# Patient Record
Sex: FEMALE | Race: NATIVE HAWAIIAN OR OTHER PACIFIC ISLANDER | NOT HISPANIC OR LATINO | Employment: STUDENT | URBAN - METROPOLITAN AREA
[De-identification: names, ages, dates, MRNs, and addresses within clinical notes are randomized per-mention and may not be internally consistent; named-entity substitution may affect disease eponyms.]

---

## 2022-02-24 ENCOUNTER — HOSPITAL ENCOUNTER (EMERGENCY)
Facility: CLINIC | Age: 27
Discharge: HOME | End: 2022-02-25
Attending: EMERGENCY MEDICINE | Admitting: EMERGENCY MEDICINE
Payer: COMMERCIAL

## 2022-02-24 VITALS
SYSTOLIC BLOOD PRESSURE: 129 MMHG | RESPIRATION RATE: 16 BRPM | DIASTOLIC BLOOD PRESSURE: 91 MMHG | TEMPERATURE: 97.5 F | OXYGEN SATURATION: 100 % | BODY MASS INDEX: 28.12 KG/M2 | HEART RATE: 106 BPM | WEIGHT: 175 LBS | HEIGHT: 66 IN

## 2022-02-24 DIAGNOSIS — M79.662 PAIN OF LEFT LOWER LEG: ICD-10-CM

## 2022-02-24 LAB — D DIMER PPP FEU-MCNC: <0.27 UG/ML FEU (ref 0–0.5)

## 2022-02-24 PROCEDURE — 80053 COMPREHEN METABOLIC PANEL: CPT | Performed by: EMERGENCY MEDICINE

## 2022-02-24 PROCEDURE — 84703 CHORIONIC GONADOTROPIN ASSAY: CPT | Performed by: EMERGENCY MEDICINE

## 2022-02-24 PROCEDURE — 99285 EMERGENCY DEPT VISIT HI MDM: CPT | Performed by: EMERGENCY MEDICINE

## 2022-02-24 PROCEDURE — 85025 COMPLETE CBC W/AUTO DIFF WBC: CPT | Performed by: EMERGENCY MEDICINE

## 2022-02-24 PROCEDURE — 99285 EMERGENCY DEPT VISIT HI MDM: CPT | Mod: 25 | Performed by: EMERGENCY MEDICINE

## 2022-02-24 PROCEDURE — 85014 HEMATOCRIT: CPT | Performed by: EMERGENCY MEDICINE

## 2022-02-24 PROCEDURE — 84484 ASSAY OF TROPONIN QUANT: CPT | Performed by: EMERGENCY MEDICINE

## 2022-02-24 PROCEDURE — 85379 FIBRIN DEGRADATION QUANT: CPT | Performed by: EMERGENCY MEDICINE

## 2022-02-24 PROCEDURE — 36415 COLL VENOUS BLD VENIPUNCTURE: CPT | Performed by: EMERGENCY MEDICINE

## 2022-02-24 PROCEDURE — 99284 EMERGENCY DEPT VISIT MOD MDM: CPT | Mod: 25 | Performed by: EMERGENCY MEDICINE

## 2022-02-25 ENCOUNTER — APPOINTMENT (OUTPATIENT)
Dept: CT IMAGING | Facility: CLINIC | Age: 27
End: 2022-02-25
Attending: EMERGENCY MEDICINE
Payer: COMMERCIAL

## 2022-02-25 ENCOUNTER — APPOINTMENT (OUTPATIENT)
Dept: ULTRASOUND IMAGING | Facility: CLINIC | Age: 27
End: 2022-02-25
Attending: EMERGENCY MEDICINE
Payer: COMMERCIAL

## 2022-02-25 LAB
ALBUMIN SERPL-MCNC: 4.2 G/DL (ref 3.4–5)
ALP SERPL-CCNC: 72 U/L (ref 40–150)
ALT SERPL W P-5'-P-CCNC: 17 U/L (ref 0–50)
ANION GAP SERPL CALCULATED.3IONS-SCNC: 7 MMOL/L (ref 3–14)
AST SERPL W P-5'-P-CCNC: 14 U/L (ref 0–45)
ATRIAL RATE - MUSE: 88 BPM
BASOPHILS # BLD AUTO: 0 10E3/UL (ref 0–0.2)
BASOPHILS NFR BLD AUTO: 0 %
BILIRUB SERPL-MCNC: 0.4 MG/DL (ref 0.2–1.3)
BUN SERPL-MCNC: 16 MG/DL (ref 7–30)
CALCIUM SERPL-MCNC: 9.4 MG/DL (ref 8.5–10.1)
CHLORIDE BLD-SCNC: 107 MMOL/L (ref 94–109)
CO2 SERPL-SCNC: 24 MMOL/L (ref 20–32)
CREAT SERPL-MCNC: 0.73 MG/DL (ref 0.52–1.04)
DIASTOLIC BLOOD PRESSURE - MUSE: NORMAL MMHG
EOSINOPHIL # BLD AUTO: 0.1 10E3/UL (ref 0–0.7)
EOSINOPHIL NFR BLD AUTO: 1 %
ERYTHROCYTE [DISTWIDTH] IN BLOOD BY AUTOMATED COUNT: 12.5 % (ref 10–15)
GFR SERPL CREATININE-BSD FRML MDRD: >90 ML/MIN/1.73M2
GLUCOSE BLD-MCNC: 93 MG/DL (ref 70–99)
HCG SERPL QL: NEGATIVE
HCT VFR BLD AUTO: 42.5 % (ref 35–47)
HGB BLD-MCNC: 13.6 G/DL (ref 11.7–15.7)
IMM GRANULOCYTES # BLD: 0 10E3/UL
IMM GRANULOCYTES NFR BLD: 0 %
INTERPRETATION ECG - MUSE: NORMAL
LYMPHOCYTES # BLD AUTO: 1.5 10E3/UL (ref 0.8–5.3)
LYMPHOCYTES NFR BLD AUTO: 21 %
MCH RBC QN AUTO: 28 PG (ref 26.5–33)
MCHC RBC AUTO-ENTMCNC: 32 G/DL (ref 31.5–36.5)
MCV RBC AUTO: 87 FL (ref 78–100)
MONOCYTES # BLD AUTO: 0.6 10E3/UL (ref 0–1.3)
MONOCYTES NFR BLD AUTO: 9 %
NEUTROPHILS # BLD AUTO: 4.7 10E3/UL (ref 1.6–8.3)
NEUTROPHILS NFR BLD AUTO: 69 %
NRBC # BLD AUTO: 0 10E3/UL
NRBC BLD AUTO-RTO: 0 /100
P AXIS - MUSE: 36 DEGREES
PLATELET # BLD AUTO: 194 10E3/UL (ref 150–450)
POTASSIUM BLD-SCNC: 3.7 MMOL/L (ref 3.4–5.3)
PR INTERVAL - MUSE: 114 MS
PROT SERPL-MCNC: 8.7 G/DL (ref 6.8–8.8)
QRS DURATION - MUSE: 84 MS
QT - MUSE: 376 MS
QTC - MUSE: 454 MS
R AXIS - MUSE: 37 DEGREES
RBC # BLD AUTO: 4.86 10E6/UL (ref 3.8–5.2)
SODIUM SERPL-SCNC: 138 MMOL/L (ref 133–144)
SYSTOLIC BLOOD PRESSURE - MUSE: NORMAL MMHG
T AXIS - MUSE: 35 DEGREES
TROPONIN I SERPL HS-MCNC: <3 NG/L
VENTRICULAR RATE- MUSE: 88 BPM
WBC # BLD AUTO: 6.9 10E3/UL (ref 4–11)

## 2022-02-25 PROCEDURE — 71275 CT ANGIOGRAPHY CHEST: CPT

## 2022-02-25 PROCEDURE — 250N000009 HC RX 250: Performed by: EMERGENCY MEDICINE

## 2022-02-25 PROCEDURE — 93971 EXTREMITY STUDY: CPT | Mod: LT

## 2022-02-25 PROCEDURE — 71275 CT ANGIOGRAPHY CHEST: CPT | Mod: 26 | Performed by: RADIOLOGY

## 2022-02-25 PROCEDURE — 93971 EXTREMITY STUDY: CPT | Mod: 26 | Performed by: RADIOLOGY

## 2022-02-25 PROCEDURE — 250N000011 HC RX IP 250 OP 636: Performed by: EMERGENCY MEDICINE

## 2022-02-25 RX ORDER — IOPAMIDOL 755 MG/ML
61 INJECTION, SOLUTION INTRAVASCULAR ONCE
Status: COMPLETED | OUTPATIENT
Start: 2022-02-25 | End: 2022-02-25

## 2022-02-25 RX ADMIN — IOPAMIDOL 61 ML: 755 INJECTION, SOLUTION INTRAVENOUS at 02:35

## 2022-02-25 RX ADMIN — SODIUM CHLORIDE, PRESERVATIVE FREE 91 ML: 5 INJECTION INTRAVENOUS at 02:30

## 2022-02-25 NOTE — DISCHARGE INSTRUCTIONS
TODAY'S VISIT:  You were seen today for leg pain.   - The cause of your symptoms is not yet known. That being the case, it is very important that you follow-up closely with the Primary Care team for the symptoms as well as your other symptoms (such as the more longstanding abdominal symptoms you mention). You should have a repeat ultrasound within a week for a recheck to make sure you are not in the process of developing a blood clot that we just couldn't see today.  - You may also need other tests to evaluate for why you were having these symptoms.   - You should discuss all imaging/radiology tests and laboratory tests that were performed during this visit with your usual providers to ensure you continue to improve and do not need any further evaluation, testing or management.   - Please call your Primary Care team to discuss and arrange a follow-up appointment within the next few days and make sure to get a repeat ultrasound.   - Return immediately to the nearest Emergency Department with any new or worsening symptoms or any concerns whatsoever.    FOLLOW-UP:  Please make an appointment to follow up with:  - Your Primary Care Provider at home (or here/we have made a referral for you here.) We'd like you to be seen within the next few days and re-evaluated with a repeat ultrasound to ensure no occult or early blood clots, and to be re-evaluated for your symptoms (that you presented today, as well as the more long-term abdominal symptoms).   - You should return to the nearest Emergency Department immediately with any new or worsening symptoms or any concerns.  - If you do not have a primary care provider, you can be seen in follow-up and establish care with one of our providers by calling of the the clinics below:  --- [facility] (phone: [phone])  --- [facility] (phone: [phone])   - Have your provider review the results from today's visit with you again to make  sure no further follow-up or additional testing is needed based on those results.     OTHER INSTRUCTIONS:  - Do your best to stay hydrated.    RETURN TO THE EMERGENCY DEPARTMENT  Return to the Emergency Department immediately for any new or worsening symptoms or any concerns.     Remember that you can always come back to the Emergency Department if you are not able to see your regular doctor in the amount of time listed above, if you get any new symptoms, or if there is anything that worries you.

## 2022-02-25 NOTE — ED TRIAGE NOTES
Pt. Arrives to ED w/ c/o LLE pain x1 day, no know mechanism of injury. Pt. Reports pulsating pain.

## 2022-02-25 NOTE — ED PROVIDER NOTES
ED Provider Note  [facility]      History     Chief Complaint   Patient presents with     Leg Pain     left calf     The history is provided by the patient.     Shima Del Angel is a 27 year old female who has a PMH notable for menorrhagia, prior pregnancies in setting of IUD use, has a strong family history of PE (sister passed away from a PE, may have had some sort of coagulation condition), who normally lives in [place] (travels here for nursing school), who presents today in concern for atraumatic lower lower leg/calf discomfort.     Patient does note that her sister  of a PE, She herself is on two types of birth control (OCPs and Nuvaring), and does admit to smoking.   Patient began having atraumatic, relatively acute onset left lower leg/calf discomfort earlier today.  Located in the left posterior medial calf area.  Nothing seems to make better or worse, discomfort was throbbing in nature, still present.  No numbness, tingling or weakness.  No traumas or falls.  No joint symptoms.  No new neck or back symptoms. Has not had similar previously. No swelling noted. No rashes or skin changes.     They do report having had a stressful day today, had an exam and then had another couple hours of class thereafter. In this setting, she additionally reports that today she did feel as though she may pass out.  She was unsure if this was anxiety with the exam or the leg pain symptoms, or concern about her sister's history and then having these symptoms, or related to some other cause.  No chest discomfort, no pleuritic symptoms, no shortness of breath.  Not having that symptom currently.  Has had some fast heart rate, but no palpitations/irregularity. No actual syncope. No traumas or falls. No fevers. No neuro symptoms. No HEENT changes.     She also reports having to go on long drives (at least 4-hour drives back and forth to [place] each week, coming up here for school, with  "quick turnaround and frequent driving).  About a month ago may have started having some back/flank discomfort as well as some mild abdominal discomfort with \"weird poo\" but without blood, no urinary symptoms, no other associated symptoms. This is unchanged, not an issue currently.     No other symptoms or complaints at this time. Please see ROS for further details.      Past Medical History  No past medical history on file.   Menorrhagia, prior pregnancies despite IUDs being in place  No past surgical history on file.  No current outpatient medications on file.    Allergies   Allergen Reactions     Nsaids GI Disturbance     Family History  No family history on file.  Social History   Social History     Tobacco Use     Smoking status: Not on file     Smokeless tobacco: Not on file   Substance Use Topics     Alcohol use: Not on file     Drug use: Not on file    Does report some smoking    Past medical history, past surgical history, medications, allergies, family history, and social history were reviewed with the patient. No additional pertinent items.       Review of Systems   Constitutional: Negative for chills, diaphoresis, fatigue and fever.   HENT: Negative.    Respiratory: Negative for cough and shortness of breath.    Cardiovascular: Negative.  Negative for leg swelling (left calf discomfort).   Gastrointestinal: Negative.         Nothing currently, see HPI for sx's had months ago   Genitourinary: Negative.    Musculoskeletal: Negative for back pain, neck pain and neck stiffness.        Left calf/lower leg discomfort   Skin: Negative.    Allergic/Immunologic: Negative for immunocompromised state.   Neurological: Positive for light-headedness (felt like might pass out earlier, resolved currently). Negative for dizziness, seizures, syncope, weakness, numbness and headaches.   Hematological:        Family hx of blood clots (PE w/ death) and some sort of condition that may have predisposed to such (no known such " "diagnosis in the patient).    Psychiatric/Behavioral: Negative for suicidal ideas.   All other systems reviewed and are negative.       A complete review of systems was performed with pertinent positives and negatives noted in the HPI, and all other systems negative.    Physical Exam   BP: (!) 129/91  Pulse: 106  Temp: 97.5  F (36.4  C)  Resp: 16  Height: 167.6 cm (5' 6\")  Weight: 79.4 kg (175 lb)  SpO2: 100 %  Physical Exam  CONSTITUTIONAL: Well-developed and well-nourished. Awake and alert. Non-toxic appearance. No acute distress.   HENT:   - Head: Normocephalic and atraumatic.   - Ears: Hearing and external ear grossly normal.   - Nose: Nose normal. No rhinorrhea. No epistaxis.   - Mouth/Throat: MMM  EYES: Conjunctivae and lids are normal. No scleral icterus.   NECK: Normal range of motion and phonation normal. Neck supple.  No tracheal deviation, no stridor. No edema or erythema noted.  CARDIOVASCULAR: HR elevated/regular on arrival, improved to normal range during ED stay, regular rhythm. Strong distal pulses.   PULMONARY/CHEST: Normal work of breathing. No accessory muscle usage or stridor. No respiratory distress.  No appreciable abnormal breath sounds.  ABDOMEN: Nondistended  MUSCULOSKELETAL: No visible findings, no deformity. No swelling. No apparent joint effusions. ROM seemingly intact. Does have discomfort to palpation in the posteromedial left calf. Compartments soft. Strength/sensation intact. No abnormal skin changes, skin intact. No abnormal erythema, induration, fluctuance, etc. No findings for SSTI/Necrotizing SSTI. No findings for acute infectious or inflammatory arthritis. Repeat exam towards end of the encounter shows normal appearing skin but left foot seems somewhat cooler than the right. Distal pulses are still quite strong and equal to that on the right. No pallor, no cyanosis. No focal toe findings like raynauds.   NEUROLOGIC: Awake, alert. Not disoriented. She displays no atrophy and no " tremor. Normal tone. No seizure activity. GCS 15. Strength/sensation intact. Normal coordination. Normal standing/gait.   SKIN: Skin is warm and dry. No rash noted. No diaphoresis. No pallor.   PSYCHIATRIC: Normal mood and affect. Speech and behavior normal. Thought processes linear. Cognition and memory are normal.       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     Clinically, patient appears nontoxic, NAD. Otherwise on examination, arrived w/ some tachycardia but improves to normal range. While has some discomfort to palpation in the left posteromedial calf/muscle body area do not appreciate any obvious deformity, no skin changes/findings or defects., no apparent joint swelling or findings. Compartments normal, no apparent neurovascular abnormalities appreciated. No apparent trauma.     Ddx includes, but not limited to, DVT, muscle soreness or injury/cramp, electrolyte abnormality/metabolic derangement, no findings for acute infectious or inflammatory arthritis, no findings for significant trauma to bones/joints, no findings for compartment syndrome, no findings for SSTI or necrotizing SSTI, considered radiculopathy but thought to be less likely, also considered thrombophlebitis, bakers cyst, etc. But don't appreciate either of these on exam), et al.     Also had the LH symptoms earlier which could be related to the leg like if had DVT and then PE, dysrythmia, dissection, neuro issue (no findings for such or focal sx's, etc.) etc., though could  "certainly be something like just anxiety after having a long day with exam and then the leg sx's and having to re-hash that with her potential risk factors in the setting of her sister's death related to a PE, which would be very understandable as well.     PLAN: Initial labs and DVT ultrasound were ordered from triage by another provider.  - In speaking with her myself, I learned of the near syncope symptoms, note that she was tachycardic, and her potential blood clot risk factors.  Entered the patient's information into her stratification calculator, which put her at high risk.  Discussed the R/B/A of imaging/further work-up for PE and after this discussion patient elected to move forward with testing with CT scan.    - Even if this is negative, I think she should likely have a repeat lower extremity ultrasound for DVT in a week and follow-up closely with PCP for further evaluation and management.  - She normally lives in [place], but is during school often only there for a couple of days before she comes back here for school.  She will try to arrange close follow-up this week with PCP there, but we will also provide a referral for PCP here in case she is not able to be seen there logistically within the week for recheck/repeat ultrasound.  - Risks/benefits of pursuing imaging reviewed and accepted.     RESULTS:  - Labs: D-dimer negative, negative troponin, negative pregnancy  - Imaging: Written preliminary reports reviewed:  --- LLE DVT US: \"No evidence of left lower extremity deep venous thrombosis.\"  --- CT PE: \"1.  There is no pulmonary embolus, aortic aneurysm or dissection. No acute abnormality.\"  --- Results/reports reviewed w/ patient who expresses understanding of findings and F/U recommendations.    RE-EVALUATION:  - The patient's symptoms were somewhat improved, though still having some calf/lower leg discomfort.  - Was speaking with the another ED physician (overnight EM physician), about the " patient as was initially thinking of ordering an arterial US for patient (was questioning as the left foot was somewhat fooler than the right she still had quite strong pulses on repeat examination). He offered to come and evaluate the patient as well. He was reassured by her exam, strong pulses, etc. He would recommend F/U in a week w/ repeat DVT US but did not think she needed further arterial workup for patient.   - Discussed options w/ patient, R/B/A, offered US or could call Vascular but patient declines (awake, alert, not intoxicated, asking great questions, etc. Maintains capacity at this time.) She understands she can return at any time if changes her mind and especially to immediately return w/ any new or worsening symptoms or concerns.  - Left foot seems less cool and seems to be improving closer to right foot as we were having all these discussions, her boots were off, etc.   - HR improved from earlier rates now into normal range.   - Pt otherwise continues to do well here in the ED, no acute issues or apparent concerning changes in vitals or clinical appearance.    DISCUSSIONS:  - w/ Patient: I have reviewed the available findings, plan, need for close follow up, strict return/safety instructions with the patient.   --- In addition to the F/U for the leg, also think needs to see PCP for her prior abdomen/BM sx's that were seemingly unrelated to today's symptoms.   She expressed understanding and agreement with this plan. All questions answered to the best of our ability at this time.     DISPOSITION/PLANNING:  - IMPRESSION: Left lower leg discomfort (atraumatic, new), month+ hx of abdominal/GI sx's (not present currently)  - DISPOSITION: Discharge to home  - FOLLOW-UP: PCP within the next few days, no more than a week w/ plan for re-evaluation, further workup, repeat US, etc. , additional F/U for other abdominal/BM sx's  - RECOMMENDATIONS: Conservative symptom management, strict return  instructions      ______________________________________________________________________        --  Filomena Pineda MD  [facility] EMERGENCY DEPARTMENT  2/24/2022     Filomena Pineda MD  02/26/22 2004

## 2022-02-26 ASSESSMENT — ENCOUNTER SYMPTOMS
LIGHT-HEADEDNESS: 1
NECK STIFFNESS: 0
DIAPHORESIS: 0
CARDIOVASCULAR NEGATIVE: 1
SHORTNESS OF BREATH: 0
NECK PAIN: 0
DIZZINESS: 0
FEVER: 0
CHILLS: 0
GASTROINTESTINAL NEGATIVE: 1
WEAKNESS: 0
SEIZURES: 0
BACK PAIN: 0
COUGH: 0
FATIGUE: 0
NUMBNESS: 0
HEADACHES: 0

## 2022-04-03 ENCOUNTER — HEALTH MAINTENANCE LETTER (OUTPATIENT)
Age: 27
End: 2022-04-03

## 2022-10-03 ENCOUNTER — HEALTH MAINTENANCE LETTER (OUTPATIENT)
Age: 27
End: 2022-10-03

## 2023-05-21 ENCOUNTER — HEALTH MAINTENANCE LETTER (OUTPATIENT)
Age: 28
End: 2023-05-21

## 2024-07-28 ENCOUNTER — HEALTH MAINTENANCE LETTER (OUTPATIENT)
Age: 29
End: 2024-07-28